# Patient Record
Sex: FEMALE | Race: WHITE
[De-identification: names, ages, dates, MRNs, and addresses within clinical notes are randomized per-mention and may not be internally consistent; named-entity substitution may affect disease eponyms.]

---

## 2017-11-20 ENCOUNTER — HOSPITAL ENCOUNTER (EMERGENCY)
Dept: HOSPITAL 25 - UCEAST | Age: 52
Discharge: HOME | End: 2017-11-20
Payer: COMMERCIAL

## 2017-11-20 VITALS — SYSTOLIC BLOOD PRESSURE: 124 MMHG | DIASTOLIC BLOOD PRESSURE: 75 MMHG

## 2017-11-20 DIAGNOSIS — J02.9: Primary | ICD-10-CM

## 2017-11-20 PROCEDURE — G0463 HOSPITAL OUTPT CLINIC VISIT: HCPCS

## 2017-11-20 PROCEDURE — 87651 STREP A DNA AMP PROBE: CPT

## 2017-11-20 PROCEDURE — 99212 OFFICE O/P EST SF 10 MIN: CPT

## 2017-11-20 NOTE — UC
Throat Pain/Nasal Avni HPI





- HPI Summary


HPI Summary: 


51 yo female with sore throat for 2-3 days


no f/c


some myalgias


pain R>L


no trouble handling oral secretions





no URI symptoms











- History of Current Complaint


Chief Complaint: UCGeneralIllness


Stated Complaint: SORE THROAT


Time Seen by Provider: 11/20/17 07:35


Hx Obtained From: Patient


Hx Last Menstrual Period: 9/2017


Onset/Duration: Gradual Onset, Lasting Days


Severity: Moderate


Pain Intensity: 7 - DECLINES ANALGESIC


Pain Scale Used: 0-10 Numeric


Cough: None





- Epiglottits Risk Factors


Epiglottis Risk Factors: Negative





- Allergies/Home Medications


Allergies/Adverse Reactions: 


 Allergies











Allergy/AdvReac Type Severity Reaction Status Date / Time


 


No Known Allergies Allergy   Verified 11/20/17 07:32











Home Medications: 


 Home Medications





Probiotic Product [Probiotic] 1 tab PO DAILY 11/20/17 [History Confirmed 11/20/ 17]











PMH/Surg Hx/FS Hx/Imm Hx


Previously Healthy: Yes





- Surgical History


Surgical History: Yes


Surgery Procedure, Year, and Place: LT WRIST SURGERY.  RT ANKLE SURGEY.  10/06 

ABDOMINOPLASTY AT [facility] [place].  12/97 PERINEOPLASTY AT [facility] [place]





- Family History


Known Family History: Positive: Cardiac Disease, Hypertension





- Social History


Alcohol Use: Weekly


Substance Use Type: None


Smoking Status (MU): Never Smoked Tobacco





- Immunization History


Most Recent Influenza Vaccination: Not UTD


Most Recent Tetanus Shot: UTD





Review of Systems


Constitutional: Negative


Skin: Negative


Eyes: Negative


ENT: Sore Throat


Respiratory: Negative


Cardiovascular: Negative


Gastrointestinal: Negative


Genitourinary: Negative


Motor: Negative


Neurovascular: Negative


Musculoskeletal: Negative


Neurological: Negative


Psychological: Negative


Is Patient Immunocompromised?: No


All Other Systems Reviewed And Are Negative: Yes





Physical Exam


Triage Information Reviewed: Yes


Appearance: Well-Appearing, No Pain Distress, Well-Nourished


Vital Signs: 


 Initial Vital Signs











Temp  97.6 F   11/20/17 07:26


 


Pulse  55   11/20/17 07:26


 


Resp  16   11/20/17 07:26


 


BP  124/75   11/20/17 07:26


 


Pulse Ox  100   11/20/17 07:26











Vital Signs Reviewed: Yes


Eyes: Positive: Conjunctiva Clear


ENT: Positive: Hearing grossly normal, Pharyngeal erythema, TMs normal - left OK

, right unable to vis due to cerumen, Uvula midline.  Negative: Nasal congestion

, Nasal drainage, Tonsillar swelling, Tonsillar exudate, Trismus, Muffled voice

, Hoarse voice, Dental tenderness, Sinus tenderness


Neck: Positive: Supple, Nontender, Enlarged Nodes @ - Right ant cerv


Respiratory: Positive: Lungs clear, Normal breath sounds, No respiratory 

distress, No accessory muscle use


Cardiovascular: Positive: RRR, No Murmur


Musculoskeletal: Positive: ROM Intact, No Edema


Neurological: Positive: Alert


Psychological Exam: Normal


Skin Exam: Normal





Throat Pain/Nasal Course/Dx





- Course


Course Of Treatment: strep (-)





- Differential Dx/Diagnosis


Provider Diagnoses: acute pharyngitis





Discharge





- Discharge Plan


Condition: Stable


Disposition: HOME


Prescriptions: 


Amoxicillin PO (*) [Amoxicillin 875 MG (*)] 875 mg PO BID #20 tab


Fluconazole 150 MG (NF) [Diflucan 150 mg (NF)] 150 mg PO ONCE #1 tab


Patient Education Materials:  Pharyngitis (ED)


Referrals: 


Brian Calvin MD [Primary Care Provider] - 


Additional Instructions: 


recheck in 2-3 days if not better

## 2019-01-13 ENCOUNTER — HOSPITAL ENCOUNTER (EMERGENCY)
Dept: HOSPITAL 25 - UCEAST | Age: 54
Discharge: HOME | End: 2019-01-13
Payer: COMMERCIAL

## 2019-01-13 VITALS — DIASTOLIC BLOOD PRESSURE: 80 MMHG | SYSTOLIC BLOOD PRESSURE: 131 MMHG

## 2019-01-13 DIAGNOSIS — J06.9: Primary | ICD-10-CM

## 2019-01-13 PROCEDURE — G0463 HOSPITAL OUTPT CLINIC VISIT: HCPCS

## 2019-01-13 PROCEDURE — 87651 STREP A DNA AMP PROBE: CPT

## 2019-01-13 PROCEDURE — 99212 OFFICE O/P EST SF 10 MIN: CPT

## 2019-01-13 NOTE — ED
Respiratory





- HPI Summary


HPI Summary: 


Patient  is  53 year old woman , who  present today  to the urgent care with  

sore throat for past  5 days. 


She reports that was on a plane , traveling back from [place]  on January 2 and 

thinks she picked up something from the flight.  She noticed some symptoms 2 

days later after her travel and progressed but it has been worse for past 5 

days 


 No skin rash. 


She denies any fever and chills but she takes ibuprofen on a daily basis for 

her hip pain so not sure if she had any fever.


Denies any  cough , chest pain or shortness of breath .Denies any abdominal 

pain , nausea or vomiting , diarrhea or constipation.  








- History of Current Complaint


Chief Complaint: UCRespiratory


Stated Complaint: THROAT


Time Seen by Provider: 01/13/19 08:09


Hx Obtained From: Patient


Pain Intensity: 8





- Allergy/Home Medications


Allergies/Adverse Reactions: 


 Allergies











Allergy/AdvReac Type Severity Reaction Status Date / Time


 


No Known Allergies Allergy   Verified 01/13/19 08:15














PMH/Surg Hx/FS Hx/Imm Hx


Previously Healthy: Yes


Endocrine/Hematology History: 


   Denies: Hx Diabetes


Cardiovascular History: 


   Denies: Hx Hypertension, Hx Pacemaker/ICD


 History: 


   Denies: Hx Renal Disease


Musculoskeletal History: 


   Denies: Hx Rheumatoid Arthritis, Hx Osteoporosis


Sensory History: 


   Denies: Hx Hearing Aid


Psychiatric History: Reports: Hx Anxiety


   Denies: Hx Panic Disorder





- Surgical History


Surgery Procedure, Year, and Place: LT WRIST SURGERY.  RT ANKLE SURGEY.  10/06 

ABDOMINOPLASTY AT [facility] [place].  12/97 PERINEOPLASTY AT [facility] [place]


Infectious Disease History: No


Infectious Disease History: Reports: Traveled Outside the [place] in Last 30 Days - 

[place]


   Denies: History Other Infectious Disease





- Family History


Known Family History: Positive: Cardiac Disease, Hypertension





- Social History


Alcohol Use: Weekly


Substance Use Type: Reports: None


Smoking Status (MU): Never Smoked Tobacco





Review of Systems


Constitutional: Negative


Eyes: Negative


Positive: Sore Throat


Cardiovascular: Negative


Respiratory: Negative


Negative: Cough


Gastrointestinal: Negative


Genitourinary: Negative


Musculoskeletal: Negative


Skin: Negative


Neurological: Negative


Psychological: Normal


All Other Systems Reviewed And Are Negative: Yes





Physical Exam





- Summary


Physical Exam Summary: 


Physical Exam: 


Const: Appears well. No signs of apparent distress present. Alert and oriented 

x 3.


Musculo: Walks with a normal gait.


Head/Face: Atraumatic, normocephalic on inspection.


Eyes: EOMI and PERRLA  in both eyes. Conjunctivae clear. No discharge noted 


ENT: Hearing normal, TM not visualized bilaterally, wax impacted.


Pharyngeal erythema noted, exudate noted on the right tonsil


No significant cervical lymphadenopathy


Respiratory: Respirations are unlabored.  Lungs clear to auscultation 

bilaterally, no  wheezing , rhonchi or rales noted . 


CVS:  Regular rate and Rhythm, S1S2 normal , no murmurs identified. 


Extremities: Peripheral circulation is grossly normal. Pulses 2+


Abdomen : Soft non tender ,  nondistended ,  Bowel sounds present .  No 

guarding , rebound tenderness  or  rigidity noted. 


Skin: No lesions or rash located on the upper extremities or on the lower 

extremities. 


Neuro: Cranial nerves  II to XII intact, motor and sensory intact.  DTR Intact  

bilaterally. Mood is normal. Affect is normal.











Triage Information Reviewed: Yes


Vital Signs On Initial Exam: 


 Initial Vitals











Temp Pulse Resp BP Pulse Ox


 


 98.0 F   63   18   131/80   100 


 


 01/13/19 08:08  01/13/19 08:08  01/13/19 08:08  01/13/19 08:08  01/13/19 08:08











Vital Signs Reviewed: Yes





Diagnostics





- Vital Signs


 Vital Signs











  Temp Pulse Resp BP Pulse Ox


 


 01/13/19 08:08  98.0 F  63  18  131/80  100














- Laboratory


Lab Statement: Any lab studies that have been ordered have been reviewed, and 

results considered in the medical decision making process.





Disposition





- Course


Course Of Treatment: During the visit today,  we  obtained  a rapid strep test 

which was negative.  She likely has a viral upper respiratory infection.  We 

discussed the findings and further plan.  Likely to get better on its own but I 

will prescribe the antibiotic to the pharmacy , to be filled if she has no 

improvement over next 2-3 days.  Patient expressed understanding .





- Diagnoses


Provider Diagnoses: 


 Viral URI








Discharge





- Sign-Out/Discharge


Documenting (check all that apply): Patient Departure


All imaging exams completed and their final reports reviewed: No Studies





- Discharge Plan


Condition: Stable


Disposition: HOME


Prescriptions: 


Amoxicillin PO (*) [Amoxicillin 500 MG CAP*] 500 mg PO BID 10 Days #20 cap


Patient Education Materials:  Viral Syndrome (ED)


Referrals: 


Brian Calvin MD [Primary Care Provider] - 1 Week


Additional Instructions: 


Please start taking the medication as prescribed to the pharmacy if no 

improvement over the next 2 days .


Salt water gargles and lozenges will be helpful


Keep yourself hydrated  


Follow up with your primary care doctor in 1 week


Patients blood pressure slightly high in Urgent care today  , plan follow up 

with PCP for better control 


Return to Urgent care / ER if symptoms get worse. 








- Billing Disposition and Condition


Condition: STABLE


Disposition: Home